# Patient Record
Sex: MALE | Race: WHITE | NOT HISPANIC OR LATINO | Employment: OTHER | URBAN - METROPOLITAN AREA
[De-identification: names, ages, dates, MRNs, and addresses within clinical notes are randomized per-mention and may not be internally consistent; named-entity substitution may affect disease eponyms.]

---

## 2017-09-22 ENCOUNTER — OFFICE VISIT (OUTPATIENT)
Dept: URGENT CARE | Facility: CLINIC | Age: 52
End: 2017-09-22
Payer: COMMERCIAL

## 2017-09-22 VITALS
WEIGHT: 186 LBS | TEMPERATURE: 98 F | DIASTOLIC BLOOD PRESSURE: 81 MMHG | OXYGEN SATURATION: 98 % | SYSTOLIC BLOOD PRESSURE: 125 MMHG | HEIGHT: 71 IN | HEART RATE: 68 BPM | BODY MASS INDEX: 26.04 KG/M2 | RESPIRATION RATE: 16 BRPM

## 2017-09-22 DIAGNOSIS — M54.50 ACUTE RIGHT-SIDED LOW BACK PAIN WITHOUT SCIATICA: Primary | ICD-10-CM

## 2017-09-22 PROCEDURE — 96372 THER/PROPH/DIAG INJ SC/IM: CPT | Mod: S$GLB,,, | Performed by: FAMILY MEDICINE

## 2017-09-22 PROCEDURE — 99214 OFFICE O/P EST MOD 30 MIN: CPT | Mod: 25,S$GLB,, | Performed by: FAMILY MEDICINE

## 2017-09-22 PROCEDURE — 3008F BODY MASS INDEX DOCD: CPT | Mod: S$GLB,,, | Performed by: FAMILY MEDICINE

## 2017-09-22 RX ORDER — BETAMETHASONE SODIUM PHOSPHATE AND BETAMETHASONE ACETATE 3; 3 MG/ML; MG/ML
6 INJECTION, SUSPENSION INTRA-ARTICULAR; INTRALESIONAL; INTRAMUSCULAR; SOFT TISSUE ONCE
Status: COMPLETED | OUTPATIENT
Start: 2017-09-22 | End: 2017-09-22

## 2017-09-22 RX ORDER — KETOROLAC TROMETHAMINE 30 MG/ML
30 INJECTION, SOLUTION INTRAMUSCULAR; INTRAVENOUS ONCE
Status: COMPLETED | OUTPATIENT
Start: 2017-09-22 | End: 2017-09-22

## 2017-09-22 RX ORDER — METHOCARBAMOL 500 MG/1
500 TABLET, FILM COATED ORAL 3 TIMES DAILY PRN
Qty: 30 TABLET | Refills: 0 | Status: SHIPPED | OUTPATIENT
Start: 2017-09-22 | End: 2017-10-02

## 2017-09-22 RX ADMIN — BETAMETHASONE SODIUM PHOSPHATE AND BETAMETHASONE ACETATE 6 MG: 3; 3 INJECTION, SUSPENSION INTRA-ARTICULAR; INTRALESIONAL; INTRAMUSCULAR; SOFT TISSUE at 09:09

## 2017-09-22 RX ADMIN — KETOROLAC TROMETHAMINE 30 MG: 30 INJECTION, SOLUTION INTRAMUSCULAR; INTRAVENOUS at 09:09

## 2017-09-22 NOTE — PATIENT INSTRUCTIONS
NECK/BACK PAIN [General]  Both neck and back pain are usually caused by injury to the muscles or ligaments of the spine. Sometimes the disks that separate each bone of the spine may cause pain by putting pressure on a nearby nerve. Back and neck pain may appear after a sudden twisting/bending force (such as in a car accident), or sometimes after a simple awkward movement. In either case, muscle spasm is often present and adds to the pain.    Acute neck and back pain usually gets better in one to two weeks. Pain related to disk disease, arthritis in the spinal joints or spinal stenosis (narrowing of the spinal canal) can become chronic and last for months or years.    Unless you had a forceful physical injury (for example, a car accident or fall), X-rays are usually not ordered for the initial evaluation of neck pain due to the risk of unnecessary radiation caused by xrays . If your pain does not respond to treatment course, x-rays and other tests may be performed at a later time.     Pain is generally related to inflammatory process that results from the injury.Treatment is focused to anti-inflammatory medicine and muscle relaxants. Narcotics have not been shown to be beneficial for acute low back or neck pain but actually may do some harm because it actually masks the pain and you may perform tasks you should not because the pain reflex has been blocked.       HOME CARE:  1. FOR NECK PAIN: Use a comfortable pillow that supports the head and keeps the spine in a neutral position. The position of the head should not be tilted forward or backward.  FOR BACK PAIN: You can stay in bed the first few days. But, as soon as possible, begin sitting or walking to avoid problems with prolonged bed rest (muscle weakness, worsening back stiffness and pain, blood clots in the legs).  2. When in bed, try to find a position of comfort. A firm mattress is best. Try lying flat on your back with pillows under your knees. You can also  try lying on your side with your knees bent up towards your chest and a pillow between your knees.  3. Avoid prolonged sitting. This puts more stress on the lower back than standing or walking.  4. During the first two days after injury, apply an ICE PACK to the painful area for 20 minutes every 2-4 hours. This will reduce swelling and pain. HEAT (hot shower, hot bath or heating pad) works well for muscle spasm. You can start with ice, then switch to heat after two days. Some patients feel best alternating ice and heat treatments. Use the one method that feels the best to you.  5. You may use acetaminophen (Tylenol) or ibuprofen (Motrin, Advil) to control pain, unless another pain medicine was prescribed. [NOTE: If you have chronic liver or kidney disease or ever had a stomach ulcer or GI bleeding, talk with your doctor before using these medicines.] Do not take these medications if it is a known allergy.  6. Be aware of safe lifting methods and do not lift anything over 15 pounds until all the pain is gone.    FOLLOW UP with your physician or this facility if your symptoms do not start to improve after one week. Physical therapy or further tests may be needed.      GET PROMPT MEDICAL ATTENTION if any of the following occur:  Pain becomes worse or spreads into your arms or legs  Weakness, numbness or pain in one or both arms or legs  Loss of bowel or bladder control  Numbness in the groin area  Difficulty walking  Fever over 100.0ºF (37.8ºC)    NECK SPRAIN or STRAIN    A sudden force that causes turning or bending of the neck (such as in a car accident) can stretch or tear muscles (strain) and ligaments (sprain) and cause neck pain. Sometimes neck pain occurs after a simple awkward movement. In either case, muscle spasm is commonly present and contributes to the pain.     HOME CARE:    1) You may feel more soreness and spasm the first few days after the injury. Reduce your activity level until symptoms begin to  improve.    2) When lying down, use a comfortable pillow that supports the head and keeps the spine in a neutral position. The position of the head should not be tilted forward or backward.    3) Use ice packs (ice in a plastic bag, wrapped in a towel) to treat acute pain. Apply for 20 minutes every 2-4 hours during the first two days. Then, begin local heat (hot shower, hot bath or heating pad) and massage to reduce muscle spasm. Some patients feel best alternating hot and cold treatments, or just staying with one method only. Do what feels the best to you and gives the most relief.    4) You may use acetaminophen (Tylenol) or ibuprofen (Motrin, Advil) to control pain, unless another pain medicine was prescribed. [ NOTE : If you have chronic liver or kidney disease or ever had a stomach ulcer or GI bleeding, talk with your doctor before using these medicines.]    FOLLOW UP with your physician or this facility if your symptoms do not show signs of improvement after one week. Physical therapy may be needed.    GET PROMPT MEDICAL ATTENTION if any of the following occur:  -- Pain becomes worse or spreads into your arms  -- Weakness or numbness in one or both arms      NEUROPATHY    is a condition that affects the nerves of the arms or legs. It causes a change in physical feeling. Sometimes it causes weakness in the muscles. You may feel tingling, numbness or shooting pains (especially at night). You may be sensitive to light touch or temperature changes.    Neuropathy may be caused by being exposed to certain drugs or chemicals, or because of a vitamin deficiency. It can be a complication of a chronic disease such as diabetes or alcoholism. A muscle spasm or bulging/ruptured disk with pressure on the nerve may also cause this condition.    HOME CARE:    1) You may take acetaminophen (Tylenol) or ibuprofen (Advil, Motrin) for pain, unless another pain medicine has been prescribed.    2) If the neuropathy affects your  feet, keep your toenails trimmed and wash your feet often. Wear shoes that fit well. Doing so avoids pressure points, blisters and ulcers. Due to a loss of feeling, you may not notice injuries, so look at your feet carefully (including the soles of your feet and between your toes) at least once a week. Tell your doctor if you have any open wounds or signs of infection.    3) If vitamins have been prescribed, be sure to remind yourself to take them daily.    FOLLOW UP with your doctor or as advised by our staff. You may need further testing to find out the exact cause of your neuropathy.    GET PROMPT MEDICAL ATTENTION if any of the following occur:    -- Redness, swelling or pus coming from the toes or feet    -- Loss of bowel or bladder control (if this is a new symptom for you)    -- Muscle weakness (if this is a new symptom for you)

## 2017-09-22 NOTE — PROGRESS NOTES
"Subjective:       Patient ID: Ankit Ibarra is a 52 y.o. male.    Vitals:  height is 5' 11" (1.803 m) and weight is 84.4 kg (186 lb). His oral temperature is 97.9 °F (36.6 °C). His blood pressure is 125/81 and his pulse is 68. His respiration is 16 and oxygen saturation is 98%.     Chief Complaint: Back Pain (patient c/o back pain x 4-5 days states non work related. Patient states he was moving equipment and over did it causing his back to have pain)    Back Pain   This is a new problem. The current episode started in the past 7 days. The problem occurs constantly. The problem has been gradually worsening since onset. The pain is present in the lumbar spine. The quality of the pain is described as stabbing. The pain is at a severity of 6/10. The pain is moderate. The pain is the same all the time. The symptoms are aggravated by lying down, twisting, standing and sitting. Stiffness is present all day. Associated symptoms include weakness. Pertinent negatives include no abdominal pain, bladder incontinence, bowel incontinence, dysuria, leg pain (states feels some pain in the groin area), numbness or tingling. He has tried NSAIDs and muscle relaxant for the symptoms. The treatment provided no relief.     Review of Systems   Constitution: Positive for weakness. Negative for malaise/fatigue.   Skin: Negative for rash.   Musculoskeletal: Positive for back pain, joint pain and stiffness. Negative for muscle weakness.   Gastrointestinal: Negative for abdominal pain and bowel incontinence.   Genitourinary: Negative for bladder incontinence, dysuria, hematuria and urgency.   Neurological: Negative for disturbances in coordination, numbness and tingling.       Objective:      Physical Exam   Constitutional: He is oriented to person, place, and time. Vital signs are normal. He appears well-developed and well-nourished. He is active and cooperative. No distress.   HENT:   Head: Normocephalic and atraumatic.   Nose: Nose " normal.   Mouth/Throat: Oropharynx is clear and moist and mucous membranes are normal.   Eyes: Conjunctivae and lids are normal.   Neck: Trachea normal, normal range of motion, full passive range of motion without pain and phonation normal. Neck supple.   Cardiovascular: Normal rate, regular rhythm and normal pulses.    Pulmonary/Chest: Effort normal and breath sounds normal.   Abdominal: Soft. Normal appearance and bowel sounds are normal. He exhibits no abdominal bruit, no pulsatile midline mass and no mass.   Musculoskeletal: He exhibits tenderness. He exhibits no edema or deformity.        Lumbar back: He exhibits decreased range of motion, tenderness and pain. He exhibits no bony tenderness.        Back:    Neurological: He is alert and oriented to person, place, and time. He has normal strength and normal reflexes. No sensory deficit.   Skin: Skin is warm, dry and intact. He is not diaphoretic.   Psychiatric: He has a normal mood and affect. His speech is normal and behavior is normal. Judgment and thought content normal. Cognition and memory are normal.   Nursing note and vitals reviewed.      Assessment:       1. Acute right-sided low back pain without sciatica        Plan:         Acute right-sided low back pain without sciatica    Other orders  -     ketorolac injection 30 mg; Inject 1 mL (30 mg total) into the muscle once.  -     betamethasone acetate-betamethasone sodium phosphate injection 6 mg; Inject 1 mL (6 mg total) into the muscle once.  -     methocarbamol (ROBAXIN) 500 MG Tab; Take 1 tablet (500 mg total) by mouth 3 (three) times daily as needed.  Dispense: 30 tablet; Refill: 0

## 2020-04-16 ENCOUNTER — NURSE TRIAGE (OUTPATIENT)
Dept: ADMINISTRATIVE | Facility: CLINIC | Age: 55
End: 2020-04-16

## 2020-04-16 ENCOUNTER — LAB VISIT (OUTPATIENT)
Dept: INTERNAL MEDICINE | Facility: CLINIC | Age: 55
End: 2020-04-16
Payer: OTHER GOVERNMENT

## 2020-04-16 ENCOUNTER — TELEMEDICINE (OUTPATIENT)
Dept: TELEMEDICINE | Facility: CLINIC | Age: 55
End: 2020-04-16

## 2020-04-16 DIAGNOSIS — R53.83 FATIGUE, UNSPECIFIED TYPE: Primary | ICD-10-CM

## 2020-04-16 DIAGNOSIS — R53.83 FATIGUE, UNSPECIFIED TYPE: ICD-10-CM

## 2020-04-16 PROCEDURE — U0002 COVID-19 LAB TEST NON-CDC: HCPCS

## 2020-04-16 NOTE — TELEPHONE ENCOUNTER
[facility] to [facility] for virtual visit due to lack of portal access.

## 2020-04-16 NOTE — TELEPHONE ENCOUNTER
Pt has had flu like symptoms for the last week inlcuding myalgias, fatigue, feeling feverish, rhinitis. He developed an intermittent cough over the last two days. Noticed SOB with walking which is abnormal for him. Not taken any meds for symptoms. Pt info given to scheduling for PCP visit. Advised to follow physician management plan. Call back if symptoms worsen or SOB develop.    Reason for Disposition   MODERATE weakness (i.e., interferes with work, school, normal activities) and cause unknown    Additional Information   Negative: Severe difficulty breathing (e.g., struggling for each breath, speaks in single words)   Negative: Shock suspected (e.g., cold/pale/clammy skin, too weak to stand, low BP, rapid pulse)   Negative: Difficult to awaken or acting confused (e.g., disoriented, slurred speech)   Negative: Fainted > 15 minutes ago and still feels too weak or dizzy to stand   Negative: SEVERE weakness (i.e., unable to walk or barely able to walk, requires support) and new onset or worsening   Negative: Sounds like a life-threatening emergency to the triager   Negative: Weakness of the face, arm or leg on one side of the body   Negative: Has diabetes and weakness from low blood sugar (i.e., < 60 mg/dl or 3.5 mmol/l)   Negative: Recent heat exposure, suspected cause of weakness   Negative: Vomiting is the main symptom   Negative: Diarrhea is the main symptom   Negative: Difficulty breathing   Negative: Heart beating < 50 beats per minute OR > 140 beats per minute   Negative: Extra heartbeats OR irregular heart beating (i.e., 'palpitations')   Negative: Follows bleeding (e.g., from vomiting, rectum, vagina)   Negative: Bloody, black, or tarry bowel movements   Negative: MODERATE weakness from poor fluid intake with no improvement after 2 hours of rest and fluids   Negative: Drinking very little and dehydration suspected (e.g., no urine > 12 hours, very dry mouth, very lightheaded)   Negative:  Patient sounds very sick or weak to the triager    Protocols used: WEAKNESS (GENERALIZED) AND FATIGUE-A-OH

## 2020-04-17 LAB — SARS-COV-2 RNA RESP QL NAA+PROBE: NOT DETECTED

## 2020-04-17 NOTE — OUTSIDE NOTE - [employer]
Visit Summary for Ankit Sanchez - Gender: Male - Date of Birth: 1965  Date: [id number] - Duration: 8 minutes  Patient: Ankit Sanchez  Provider: Tanja Melara    Patient Contact Information  Address  [address] [facility] [address]  [place] [address]  [phone]    Visit Topics  flu like symptoms for a week. [Added By: Self - 2020-04-16]  Health History  Diastolic Blood Pressure: N/A  Systolic Blood Pressure: N/A  Body Weight: 180.0 [lb_av] [Added By: Self - 2020-04-16]  Body Temperature: N/A  Allergies: [Bactrim] [Added By: Self - 2020-04-16]    Conversation Transcripts       [Notification] You are connected with Peggy Alexander, [facility].  [Notification] Ankit Sanchez is located in [place].  [Notification] Ankit Sanchez has shared health history...    Diagnosis    Procedures  Value: [id number] Code: CPT-4 OL DIG E/M SVC 11-20 MIN    Medications Prescribed  No prescriptions ordered    Medications Entered by the Patient during intake  No medications entered by patient during intake    Provider Notes       We strongly encourage you to share the following record of today's visit with   your primary care physician.   Please try to fill out all fields with data, Y, N, or N.A.  Demographics:  Contact phone number - [phone]  Patient Current Location - [facility] -   Address -   Mode of Communication:  virtual  Chief complaint/HPI:  began with flu like symptoms for a few days, got worse   over last 24 hours, cough, severe fatigue, maybe fever, no n/v, may have loss of   taste and smell   COVID-19 Risks:   Is this patient currently under investigation (PUI) for COVID-19? (Y/N) - n  to   be tested today  If so, is this a laboratory confirmed case? (Y/N) - n  Has the patient been hospitalized for flu-like symptoms or pneumonia in the 14   days prior to symptom onset? (Y/N) - n  Symptoms:   During this illness, did the patient experience any of the following symptoms?   (Y/N/Unknown):        Fever >  100.4 F (38 C) - n       Subjective fever (felt feverish) - y       Chills - n       Muscle aches (myalgia) - y       Runny nose (rhinorrhea) - n       Sore throat - n       Cough (new onset or worsening of chronic cough) - y       Shortness of breath (dyspnea) - n       Nausea or vomiting -n        Headache - n       Abdominal pain - n       Diarrhea (â¥ loose/looser than normal stools in a 24-hour period) - n       Other, specify -  severe fatigue  Exposures:  In the 14 days prior to symptom onset, any travel internationally? (Y/N) - n  In the 14 days prior to symptom onset, any travel to any location with known   COVID-19 cases? n(Y/N) -             If so, where? (name of city, state) - na  Has the patient had close contact within 6 feet of a known COVID-19 patient?   (Y/N) - n            If so, was the contact in the household, community or health care   facility? - n  Has there been exposure to a cluster of patients with severe acute lower   respiratory distress? (Y/N) - n  Is the patient a health care worker? (Y/N) - n  Other, specify -   Other History:  Past Medical History -   Past Surgical History -   Smoking History -   Allergies -   If female, currently pregnant? -  Social History (any vulnerable family members, any sick contacts) - lives alone  PE:  Weight -   Temperature -   General - nad  HEENT -   Neck/lymph -   Resp -  no sob, no flairing of nostrils, able to speak in complete sentences  Abdomen -   Skin -   Other exam -   Assessment:   COVID-19 risk (High/Med/Low-No) -   Plan/MDM:   1.     Referral for further evaluation and management -             a.     Location and phone number of referral site - [place] for   testing            b.     Reason for referral - testing            c.     Phone calls made (facility and name of contact) - [phone]  2.     Infection control measures advised -   3.     Home care measures - tylenol for fever  4.     Prescriptions (if any) -   5.     Other - go to  ER for fever above 102 and increase in sob  Please report all cases to Quality team [email or web address]  Patient Instructions:  1.     If you have been referred to an urgent care or emergency room for   additional care, it is very important that youproceed directly there and do not   delay or detour.  Please avoid public places where you might come in contact   with others and be sure to practice proper cough and sneeze etiquette to prevent   the spread of germs.  2.     If you have been referred for possible coronavirus exposure, you should   call the facility upon arrival but before entering the building, as they may   have special instructionson which door you should enter and may need a few   moments to ensure you can be brought back to a private room right away.  3.     If you meet criteria for possible coronavirus exposure, we may need to   contact your local or state health department and referral center for proper   coordination and follow up.  4.     Other instructions -   Follow up:  Please reconnect for another online visit or see your PCP or urgent care   provider if symptoms worsen or new symptoms appear at any point, or if still   with fever or additional symptoms after 2-3 more days.  If you received a prescription at this visit and have any questions or problems   with the prescription, call [phone] for assistance.  Patient indicates understanding and agrees with plan.  Please print a copy of this note and send it to your regular doctor or take it   to your next visit so it may be included in your medical record.  Patient voiced   understanding and agrees to plan. Please see your PCP on an annual basis for   prevention services, sooner for follow up of chronic medical conditions.    Follow-Up Suggestions  Reminder    Please follow standard patient screening protocol for COVID-19. The following   centers may have testing available for non-High Risk- Complicated cases (High   Risk patients  should still be referred to ED). If using these sites, please have   the patient call beforehand on procedure and testing availability.    [facility] Locations:    [place] [facility]  Priti Olsen,   Cecy Tafoya, Clinic Manager  [address] [place] [address]  Phone: [phone] or [phone]  Fax: [phone]  Weekdays 8am-7pm, Weekends 9am-6pm   [facility]  Hanane Obrien, Supervisor  Ceasar Ley, Clinic Manager  [address] [place] [address], [place] [address]  Phone: [phone] or [phone]  Fax: [phone]  Weekdays 8am-8pm, Weekends 9am-5pm   [place]  [place] Location  [address] [place] [address] [place] [address]  [address] [facility], [address]  [phone]  [phone]  [phone]  [phone]  Monday-Friday, 8am-8pm; Saturday-Sunday, 9am-3pm   [facility] ([facility])  Patients needing COVID testing are to be instructed to call the [facility] call   center and they will be directed on where to be tested:  Monday â Friday: [phone]  Saturday - Sunday: [phone]   Patients can choose an [facility] within 20 miles of drop-off location   for FREE Same Day Prescription Delivery (coordinated by [facility] the   platform). Only for participating [facility] pharmacies. Available Monday â   Friday 8am â 4pm. Saturday: [facility] only 8am â 7pm. Patient can   expect a pharmacy call after visit for over the phone payment prior to live   tracking delivery.     Electronically signed by: Tanja Melara(NPI [id number])

## 2020-04-19 ENCOUNTER — TELEPHONE (OUTPATIENT)
Dept: SURGERY | Facility: HOSPITAL | Age: 55
End: 2020-04-19

## 2020-04-21 NOTE — PROGRESS NOTES
Video connection - adequate  Phone connection  Provider location - [place]  Patient location- [place]    Chief complaint: fever, malaise, maybe loss of smell and taste  Onset: 6-7 days  Mild,  Travel:none recently   Exposure: none  Healthcare worker:no  Pregnant:na  Immunocompromised: na    PMH: asthma,   Medications: xanas  Allergies: sulfa  Surg History:  Social History:  LMP: na    ROS  Positive for: fever, malaise, some nasal congestion  Negative for: chills, sob    PE  Vitals: Not taken per patient  Gen:   Well developed, well-nourished in NAD  Psych:   Normal behavior, normal affect.  HENT:   Normocephalic, atraumatic,  No cervical adenopathy palpated on patient self-exam  No tenderness to palpation of the frontal sinuses on patient self-exam  No tenderness to palpation of the maxillary sinuses on patient self-exam  Resp:   Normal respiratory effort  no retractions  no increased work of breathing  no audible wheezes  CV:   no erika oral cyanosis  Skin:   No observed rashes/bruises.    Assessment: meets criteria for covid testing, will be going to testing site in [facility] called and pt instructed to call ahead to get time    Plan:   COVID-19 home surveillance ordered   pulse oximeter krystal - watch that your pulse ox doesn't go below 92% - check back in if it does   Please continue infection control precautions like covering your mouth when coughing, washing hands frequently and minimizing contact with others whenever possible. Current CDC recommendations do not require quarantine if you are feeling OK and havent had fever in 72 hours with suspected corona and 7 days from onset of symptoms. If it is not suspected corona you should be released after 24 hours of being fever free.   See a provider 24/7 with a convenient virtual visit. For more information or to download the krystal, visit [email or web address]   [facility] - For 24/7 nurse advice, call [phone] or call our free COVID-19  information line at [phone]   Text or Dial 211 Text the keyword LACOVID to [phone] or dial 771 for the latest information   Verbally discussed plan and patient confirms understanding        Follow up:  If there are any questions or problems with the prescription, call [phone] anytime for assistance.   1. Please schedule a virtual follow up visit first.  2. Please see an in-person provider if your symptoms are worsening or not improving in 2-3 days.   3. Please print a copy of this note and send it to your regular doctor or take it to your next visit so it may be included in your medical record.   4. Contact customer support at [phone] for questions or concerns   5. You must understand that you've received a Telehealth Urgent Care treatment only and that you may be released before all your medical problems are known or treated. You, the patient, will arrange for follow up care as instructed.  6. Follow up with your PCP or specialty clinic as directed in the next 1-2 days if not improved or as needed.  You can call [phone] to schedule an appointment with the appropriate provider with [facility]   If your condition worsens we recommend that you receive another evaluation at an Urgent care, in the emergency room immediately or contact your primary medical clinics after hours call service to discuss your concerns.        OVER THE COUNTER RECOMMENDATIONS/SUGGESTIONS.     Make sure to stay well hydrated.   Use Nasal Saline to mechanically move any post nasal drip from your eustachian tube or from the back of your throat.   Use warm saltwater gargles to ease your throat pain. Warm saltwater gargles as needed for sore throat-  1/2 tsp salt to 1 cup warm water, gargle as desired.   Use an antihistamine such as Claritin, Zyrtec or Allegra to dry you out.    Use pseudoephedrine (behind the counter) to decongest. Pseudoephedrine  30 mg up to 240 mg /day. It can raise your blood pressure and give you  palpitations.   Use Mucinex (guaifenisin) to break up mucous up to 2400mg/day to loosen any mucous.    The Mucinex DM pill has a cough suppressant that can be sedating. It can be used at night to stop the tickle at the back of your throat.   You can use Mucinex D (it has guaifenesin and a high dose of pseudoephedrine) in the mornings to help decongest.   Use Afrin (oxymetazoline) in each nare for no longer than 3 days, as it is addictive. It can also dry out your mucous membranes and cause elevated blood pressure. This is especially useful if you are flying.   Use Flonase 1-2 sprays/nostril per day. It is a local acting steroid nasal spray, if you develop a bloody nose, stop using the medication immediately.   Sometimes Nyquil at night is beneficial to help you get some rest, however it is sedating, and it does have an antihistamine, and Tylenol.   Honey is a natural cough suppressant that can be used.   Tylenol up to 4,000 mg a day is safe for short periods and can be used for body aches, pain, and fever. However, in high doses and prolonged use it can cause liver irritation.   Ibuprofen is a non-steroidal anti-inflammatory that can be used for body aches, pain, and fever. However, it can also cause stomach irritation if overused.